# Patient Record
Sex: MALE | Race: WHITE | NOT HISPANIC OR LATINO
[De-identification: names, ages, dates, MRNs, and addresses within clinical notes are randomized per-mention and may not be internally consistent; named-entity substitution may affect disease eponyms.]

---

## 2021-08-25 DIAGNOSIS — Z20.822 CONTACT WITH AND (SUSPECTED) EXPOSURE TO COVID-19: ICD-10-CM

## 2021-08-27 ENCOUNTER — APPOINTMENT (OUTPATIENT)
Dept: DISASTER EMERGENCY | Facility: CLINIC | Age: 41
End: 2021-08-27

## 2021-08-29 LAB — SARS-COV-2 N GENE NPH QL NAA+PROBE: NOT DETECTED

## 2021-08-31 ENCOUNTER — APPOINTMENT (OUTPATIENT)
Dept: PULMONOLOGY | Facility: CLINIC | Age: 41
End: 2021-08-31
Payer: COMMERCIAL

## 2021-08-31 LAB — POCT - HEMOGLOBIN (HGB), QUANTITATIVE, TRANSCUTANEOUS: 14.7

## 2021-08-31 PROCEDURE — 94010 BREATHING CAPACITY TEST: CPT

## 2021-08-31 PROCEDURE — ZZZZZ: CPT

## 2021-08-31 PROCEDURE — 94727 GAS DIL/WSHOT DETER LNG VOL: CPT

## 2021-08-31 PROCEDURE — 99204 OFFICE O/P NEW MOD 45 MIN: CPT | Mod: 25

## 2021-08-31 PROCEDURE — 88738 HGB QUANT TRANSCUTANEOUS: CPT

## 2021-08-31 PROCEDURE — 94729 DIFFUSING CAPACITY: CPT

## 2021-09-02 NOTE — PROCEDURE
[FreeTextEntry1] : original study 2/24 2017 mild MAANDA with significant snoring\par \par Pulmonary function testing of August 31, 2021.\par FEV1 is reduced, FVC and FEV1/FVC are normal. TLC is normal. FRC is normal. RV is increased.  RV/TLC ratio is increased. Single breath diffusion capacity is normal. \par PFT attached relatively stable function.\par

## 2021-09-02 NOTE — DISCUSSION/SUMMARY
[FreeTextEntry1] : Obstructive sleep apnea syndrome.\par \par Borderline mild restrictive pulmonary physiology likely related to body habitus.

## 2021-09-02 NOTE — HISTORY OF PRESENT ILLNESS
[Former] : former [< 30 pack-years] : < 30 pack-years [Obstructive Sleep Apnea] : obstructive sleep apnea [Awakes Unrefreshed] : awakes unrefreshed [Awakes with Headache] : awakes with headache [Difficulty Initiating Sleep] : difficulty initiating sleep [Snoring] : snoring [TextBox_4] : CARLOS ZAVALETA is a 40 year old  M referred for pulmonary evaluation for AMANDA\par \par Has been using auto cpap dream machine since 3/2017. \par has 2 night HHS 2017 mild AMANDA\par Patient using machine nightly, if noes not feels am headaches and excessive day time sleepiness\par weight goes up and down, AMANDA worse with weight gain\par aware of recall and has registered\par \par No old machine\par \par AMANDA was mild on CPAP\par Had uncomplicated cholecystectomy 2 years prior\par \par Past pulmonary history. AMANDA\par Occupational Exposure.\par Family history of pulmonary disease.\par Recent travel N\par Pets Dogs not allergic\par  [TextBox_11] : 1 [TextBox_13] : 10 [YearQuit] : 2008

## 2021-09-02 NOTE — ASSESSMENT
[FreeTextEntry1] : will repeat 2 night HHs in January for replacement machine\par patient will bring in chip for download\par \par Patient was informed about the recall of [employer] CPAP machines.  Risks and benefits of continuing treatment versus interruption of treatment discussed.  Patient encouraged to go to the  website and to reach out to DME regarding device upgrade and or replacement.\par \par Options discussed including continued use of BC versus observation of CPAP.  Patient feels much more comfortable continuing CPAP.\par Does not have old machine.\par Also advised to call vendor and insurance company.\par \par

## 2021-09-02 NOTE — CONSULT LETTER
[Dear  ___] : Dear ~JANEL, [Consult Letter:] : I had the pleasure of evaluating your patient, [unfilled]. [Consult Closing:] : Thank you very much for allowing me to participate in the care of this patient.  If you have any questions, please do not hesitate to contact me. [Sincerely,] : Sincerely, [FreeTextEntry2] : Luis Gomez MD\par  [FreeTextEntry3] : Luis South MD FCCP\par

## 2022-01-25 ENCOUNTER — APPOINTMENT (OUTPATIENT)
Dept: PULMONOLOGY | Facility: CLINIC | Age: 42
End: 2022-01-25

## 2022-02-14 ENCOUNTER — APPOINTMENT (OUTPATIENT)
Dept: PULMONOLOGY | Facility: CLINIC | Age: 42
End: 2022-02-14
Payer: COMMERCIAL

## 2022-02-14 PROCEDURE — 95800 SLP STDY UNATTENDED: CPT

## 2022-02-15 ENCOUNTER — APPOINTMENT (OUTPATIENT)
Dept: UROLOGY | Facility: CLINIC | Age: 42
End: 2022-02-15
Payer: COMMERCIAL

## 2022-02-15 ENCOUNTER — TRANSCRIPTION ENCOUNTER (OUTPATIENT)
Age: 42
End: 2022-02-15

## 2022-02-15 DIAGNOSIS — Z87.438 PERSONAL HISTORY OF OTHER DISEASES OF MALE GENITAL ORGANS: ICD-10-CM

## 2022-02-15 DIAGNOSIS — N52.9 MALE ERECTILE DYSFUNCTION, UNSPECIFIED: ICD-10-CM

## 2022-02-15 PROCEDURE — 99204 OFFICE O/P NEW MOD 45 MIN: CPT | Mod: 95

## 2022-02-15 RX ORDER — SILDENAFIL 100 MG/1
100 TABLET, FILM COATED ORAL
Qty: 30 | Refills: 0 | Status: ACTIVE | COMMUNITY
Start: 2022-02-15 | End: 1900-01-01

## 2022-02-15 NOTE — PHYSICAL EXAM
[General Appearance - Well Developed] : well developed [General Appearance - Well Nourished] : well nourished [Normal Appearance] : normal appearance [Well Groomed] : well groomed [General Appearance - In No Acute Distress] : no acute distress [Oriented To Time, Place, And Person] : oriented to person, place, and time [Affect] : the affect was normal [Not Anxious] : not anxious [Mood] : the mood was normal

## 2022-02-15 NOTE — LETTER BODY
[FreeTextEntry1] : Dear Dr. Matias Leyva,\par \par Thank you for referring your patient Nico Yung for consultation for.  I have requested several baseline blood studies. I will see the patient back in followup shortly and make further recommendations. I have attached a copy of my consultation note for your records.\par \par Thank you again for this kind referral. I will certainly keep you updated with further progress. Please do not hesitate to call me if you have any questions.\par \par Best regards,\par \par \par \par Cristhian Oh M.D., PhD\par Professor of Urology\par    [facility] [employer] [facility]\par  for Quality\par Director, Reproductive and Sexual Medicine\par    [facility]

## 2022-02-15 NOTE — HISTORY OF PRESENT ILLNESS
[Home] : at home, [unfilled] , at the time of the visit. [Medical Office: ([facility])___] : at the medical office located in  [Verbal consent obtained from patient] : the patient, [unfilled] [FreeTextEntry1] : The patient-doctor. relationship has been established in a face-to-face fashion on real-time video audio HIPAA compliant communication using telemedicine software. The patient was at home and the physician was in his office. The patient's identity has been confirmed.  The patient was previously emailed a copy of the telemedicine consent.  The patient has had a chance to review and has now given verbal consent and has requested care to be assessed and treated through telemedicine. The patient understands there may be limitations in this process and that they need not need further follow-up care in the office and/or hospital settings. We were unable to use the [employer] platform and an alternative platform was utilized.  The patient was at home and I was in the office.\par \par Verbal consent was given on Tuesday, February 15, 2022 at 11 AM by the patient.  It was requested by the physician.  A written consent was previously sent for the patient to sign and return.\par \par Patient is a 41-year-old gentleman with epilepsy who presents with the chief complaint of erectile dysfunction for evaluation. His erections are 6/10 in tumescence and rigidity and tumescence. He takes 20 mg of Tadalafil a few years prior. He now takes 100 mg of sildenafil.  It appears to work very nicely.  He would like to have further evaluation of his erectile dysfunction.  He feels its due to the Tegretol that he has been on for his epilepsy.  His erections began to decrease approximately 6 months after starting the medication.  He is  and has 2 children..  He works as a .   I reviewed the questionnaire he had completed with him in detail.  The patient denies fevers, chills, nausea and/or vomiting and no unexplained weight loss. He has no known drug allergies.  His past medical history demonstrates no significant urologic issues.  In his present occupation as a  he has no known toxin exposure.  He does smoke and drinks only socially.  He has no known drug allergies.  His review of systems is non-contributory. His family history is not significant.

## 2022-02-15 NOTE — ASSESSMENT
[FreeTextEntry1] : This pleasant gentleman presents for evaluation of his sexual dysfunction.  I have requested several blood studies and a urine analysis.  I will make further recommendations when the results return. I have suggested a diagnostic injection and duplex ultrasound to evaluate his penile vasculature. We discussed his evaluation today and possible options for therapy depending upon the results of his testing.  I will be better able to make more specific recommendations after additional test results return.  He also has been using 100 mg of sildenafil and a prescription was written for him.\par 1. Review blood tests on follow up\par 2. Penile duplex study on follow up\par \par Telehealth Consultation: 40 minutes  20 minutes reviewing his history and discussing prior results.  20 minutes discussing various treatment options, writing medication prescriptions, requests for lab testing and writing his note. There was also additional time in preparing for the visit and assisting the patient with technology issues he was having with the telehealth platform.\par

## 2022-02-16 PROCEDURE — 95800 SLP STDY UNATTENDED: CPT

## 2022-03-14 ENCOUNTER — APPOINTMENT (OUTPATIENT)
Dept: PULMONOLOGY | Facility: CLINIC | Age: 42
End: 2022-03-14

## 2022-10-19 ENCOUNTER — APPOINTMENT (OUTPATIENT)
Dept: RADIOLOGY | Facility: CLINIC | Age: 42
End: 2022-10-19

## 2022-10-19 ENCOUNTER — OUTPATIENT (OUTPATIENT)
Dept: OUTPATIENT SERVICES | Facility: HOSPITAL | Age: 42
LOS: 1 days | End: 2022-10-19
Payer: COMMERCIAL

## 2022-10-19 DIAGNOSIS — J20.9 ACUTE BRONCHITIS, UNSPECIFIED: ICD-10-CM

## 2022-10-19 PROCEDURE — 71046 X-RAY EXAM CHEST 2 VIEWS: CPT | Mod: 26

## 2022-10-19 PROCEDURE — 71046 X-RAY EXAM CHEST 2 VIEWS: CPT

## 2022-12-05 ENCOUNTER — APPOINTMENT (OUTPATIENT)
Dept: CARDIOLOGY | Facility: CLINIC | Age: 42
End: 2022-12-05

## 2023-01-03 ENCOUNTER — EMERGENCY (EMERGENCY)
Facility: HOSPITAL | Age: 43
LOS: 1 days | Discharge: HOME | End: 2023-01-03
Attending: STUDENT IN AN ORGANIZED HEALTH CARE EDUCATION/TRAINING PROGRAM | Admitting: STUDENT IN AN ORGANIZED HEALTH CARE EDUCATION/TRAINING PROGRAM
Payer: COMMERCIAL

## 2023-01-03 VITALS
SYSTOLIC BLOOD PRESSURE: 140 MMHG | TEMPERATURE: 98 F | RESPIRATION RATE: 18 BRPM | HEART RATE: 66 BPM | OXYGEN SATURATION: 99 % | DIASTOLIC BLOOD PRESSURE: 81 MMHG

## 2023-01-03 VITALS
RESPIRATION RATE: 18 BRPM | SYSTOLIC BLOOD PRESSURE: 136 MMHG | TEMPERATURE: 98 F | HEART RATE: 65 BPM | DIASTOLIC BLOOD PRESSURE: 75 MMHG | OXYGEN SATURATION: 98 %

## 2023-01-03 LAB
ALBUMIN SERPL ELPH-MCNC: 4.8 G/DL — SIGNIFICANT CHANGE UP (ref 3.3–5)
ALP SERPL-CCNC: 76 U/L — SIGNIFICANT CHANGE UP (ref 40–120)
ALT FLD-CCNC: 28 U/L — SIGNIFICANT CHANGE UP (ref 4–41)
ANION GAP SERPL CALC-SCNC: 12 MMOL/L — SIGNIFICANT CHANGE UP (ref 7–14)
AST SERPL-CCNC: 21 U/L — SIGNIFICANT CHANGE UP (ref 4–40)
BASOPHILS # BLD AUTO: 0.03 K/UL — SIGNIFICANT CHANGE UP (ref 0–0.2)
BASOPHILS NFR BLD AUTO: 0.3 % — SIGNIFICANT CHANGE UP (ref 0–2)
BILIRUB SERPL-MCNC: 0.2 MG/DL — SIGNIFICANT CHANGE UP (ref 0.2–1.2)
BUN SERPL-MCNC: 16 MG/DL — SIGNIFICANT CHANGE UP (ref 7–23)
CALCIUM SERPL-MCNC: 9.8 MG/DL — SIGNIFICANT CHANGE UP (ref 8.4–10.5)
CHLORIDE SERPL-SCNC: 102 MMOL/L — SIGNIFICANT CHANGE UP (ref 98–107)
CK MB BLD-MCNC: 2.3 % — SIGNIFICANT CHANGE UP (ref 0–2.5)
CK MB CFR SERPL CALC: 1.7 NG/ML — SIGNIFICANT CHANGE UP
CK SERPL-CCNC: 73 U/L — SIGNIFICANT CHANGE UP (ref 30–200)
CO2 SERPL-SCNC: 28 MMOL/L — SIGNIFICANT CHANGE UP (ref 22–31)
CREAT SERPL-MCNC: 0.72 MG/DL — SIGNIFICANT CHANGE UP (ref 0.5–1.3)
D DIMER BLD IA.RAPID-MCNC: 920 NG/ML DDU — HIGH
EGFR: 118 ML/MIN/1.73M2 — SIGNIFICANT CHANGE UP
EOSINOPHIL # BLD AUTO: 0.53 K/UL — HIGH (ref 0–0.5)
EOSINOPHIL NFR BLD AUTO: 5 % — SIGNIFICANT CHANGE UP (ref 0–6)
FLUAV AG NPH QL: SIGNIFICANT CHANGE UP
FLUBV AG NPH QL: SIGNIFICANT CHANGE UP
GLUCOSE SERPL-MCNC: 103 MG/DL — HIGH (ref 70–99)
HCT VFR BLD CALC: 44.4 % — SIGNIFICANT CHANGE UP (ref 39–50)
HGB BLD-MCNC: 14.4 G/DL — SIGNIFICANT CHANGE UP (ref 13–17)
IANC: 7.39 K/UL — SIGNIFICANT CHANGE UP (ref 1.8–7.4)
IMM GRANULOCYTES NFR BLD AUTO: 0.8 % — SIGNIFICANT CHANGE UP (ref 0–0.9)
LIDOCAIN IGE QN: 24 U/L — SIGNIFICANT CHANGE UP (ref 7–60)
LYMPHOCYTES # BLD AUTO: 1.9 K/UL — SIGNIFICANT CHANGE UP (ref 1–3.3)
LYMPHOCYTES # BLD AUTO: 17.8 % — SIGNIFICANT CHANGE UP (ref 13–44)
MCHC RBC-ENTMCNC: 27.4 PG — SIGNIFICANT CHANGE UP (ref 27–34)
MCHC RBC-ENTMCNC: 32.4 GM/DL — SIGNIFICANT CHANGE UP (ref 32–36)
MCV RBC AUTO: 84.6 FL — SIGNIFICANT CHANGE UP (ref 80–100)
MONOCYTES # BLD AUTO: 0.73 K/UL — SIGNIFICANT CHANGE UP (ref 0–0.9)
MONOCYTES NFR BLD AUTO: 6.8 % — SIGNIFICANT CHANGE UP (ref 2–14)
NEUTROPHILS # BLD AUTO: 7.39 K/UL — SIGNIFICANT CHANGE UP (ref 1.8–7.4)
NEUTROPHILS NFR BLD AUTO: 69.3 % — SIGNIFICANT CHANGE UP (ref 43–77)
NRBC # BLD: 0 /100 WBCS — SIGNIFICANT CHANGE UP (ref 0–0)
NRBC # FLD: 0 K/UL — SIGNIFICANT CHANGE UP (ref 0–0)
PLATELET # BLD AUTO: 403 K/UL — HIGH (ref 150–400)
POTASSIUM SERPL-MCNC: 5.3 MMOL/L — SIGNIFICANT CHANGE UP (ref 3.5–5.3)
POTASSIUM SERPL-SCNC: 5.3 MMOL/L — SIGNIFICANT CHANGE UP (ref 3.5–5.3)
PROT SERPL-MCNC: 7.6 G/DL — SIGNIFICANT CHANGE UP (ref 6–8.3)
RBC # BLD: 5.25 M/UL — SIGNIFICANT CHANGE UP (ref 4.2–5.8)
RBC # FLD: 12.2 % — SIGNIFICANT CHANGE UP (ref 10.3–14.5)
RSV RNA NPH QL NAA+NON-PROBE: SIGNIFICANT CHANGE UP
SARS-COV-2 RNA SPEC QL NAA+PROBE: SIGNIFICANT CHANGE UP
SODIUM SERPL-SCNC: 142 MMOL/L — SIGNIFICANT CHANGE UP (ref 135–145)
TROPONIN T, HIGH SENSITIVITY RESULT: <6 NG/L — SIGNIFICANT CHANGE UP
WBC # BLD: 10.66 K/UL — HIGH (ref 3.8–10.5)
WBC # FLD AUTO: 10.66 K/UL — HIGH (ref 3.8–10.5)

## 2023-01-03 PROCEDURE — 74174 CTA ABD&PLVS W/CONTRAST: CPT | Mod: 26,MA

## 2023-01-03 PROCEDURE — 71275 CT ANGIOGRAPHY CHEST: CPT | Mod: 26,MA

## 2023-01-03 PROCEDURE — 71046 X-RAY EXAM CHEST 2 VIEWS: CPT | Mod: 26

## 2023-01-03 PROCEDURE — 99285 EMERGENCY DEPT VISIT HI MDM: CPT

## 2023-01-03 RX ADMIN — Medication 30 MILLILITER(S): at 13:17

## 2023-01-03 NOTE — ED PROVIDER NOTE - OBJECTIVE STATEMENT
42-year-old male with past medical history of hiatal hernia, GERD, epilepsy presents to ED complaining of chest pain since 1:30 AM.  Patient's the pain woke him up from his sleep, describes pain on the left side that progressed to sharp pain of his esophagus radiating to the left side of his neck.  Patient states does not feel like a burning like his usual GERD and took Pepcid which did not help.  Patient states he felt intermittent chills, diaphoresis, body aches and nausea which self resolved.  Patient denies any sick contacts, fevers, cough, shortness of breath, vomiting, diarrhea.

## 2023-01-03 NOTE — ED ADULT NURSE NOTE - CHIEF COMPLAINT QUOTE
Letter re-faxed to fax number below.  
Patient is calling to ask if his work status was faxed over to Jay. Patient states that when he was seen on 3/5 he requested the office note and work status be faxed to [phone] but Jay has not yet received it. Please re-fax. Patient's claim# is [id number].   
p/t c/o of midsternal chest pain radiating to throat since last night, denies any nausea or vomiting, denies sob, denies recent travel

## 2023-01-03 NOTE — ED PROVIDER NOTE - PATIENT PORTAL LINK FT
You can access the FollowMyHealth Patient Portal offered by [facility] by registering at the following website: [email or web address]. By joining [email or web address]’s FollowMyHealth portal, you will also be able to view your health information using other applications (apps) compatible with our system.

## 2023-01-03 NOTE — ED PROVIDER NOTE - PROGRESS NOTE DETAILS
Dimer elevated, patient updated on results.  CT angio ordered, CT tech aware.  Images pending. Jd Bangura, ED Attending JEROME Falcon- d dimer elevated. ct tech aware will r/o aortic dissection on CTA chest, abdomen and pelvis. CT tech called x3, will send transportation for patient to have dissection study done. Jd Bangura, ED Attending JEROME Falcon- pt feeling better. cta with no acute findings. amenable for dc home.

## 2023-01-03 NOTE — ED PROVIDER NOTE - NS ED ATTENDING STATEMENT MOD
This was a shared visit with the JAYLIN. I reviewed and verified the documentation and independently performed the documented:

## 2023-01-03 NOTE — ED ADULT NURSE NOTE - OBJECTIVE STATEMENT
patient a&Ox4, ambulatory, c/o midsternal chest pain/epigastric pain radiating to throat since last night. patient states he has hx of hilital hernia and this pain feels the same but more severe. patient well appearing, no acute distress noted. 20G IV Placed in R AC, labs sent.

## 2023-01-03 NOTE — ED ADULT TRIAGE NOTE - CHIEF COMPLAINT QUOTE
p/t c/o of midsternal chest pain radiating to throat since last night, denies any nausea or vomiting, denies sob, denies recent travel

## 2023-01-03 NOTE — ED PROVIDER NOTE - NSFOLLOWUPINSTRUCTIONS_ED_ALL_ED_FT
Follow up with Cardiology this week    Follow up with Gastroenterology this week. Take Pepcid 20 mg 30 minutes before meals 2x/day.  Take over the counter Maalox as needed. Stop eating spicy and acidic foods. Eat smaller meals more frequently.      Worsening pain, new fever, chills, nausea, vomiting, new chest pain/shortness of breath return to ER

## 2023-01-03 NOTE — ED PROVIDER NOTE - CLINICAL SUMMARY MEDICAL DECISION MAKING FREE TEXT BOX
42-year-old male with past medical history of hiatal hernia, GERD, epilepsy presents to ED complaining of chest pain since 1:30 AM.  Patient's the pain woke him up from his sleep, describes pain on the left side that progressed to sharp pain of his esophagus radiating to the left side of his neck.  Patient states does not feel like a burning like his usual GERD and took Pepcid which did not help.  Patient states he felt intermittent chills, diaphoresis, body aches and nausea which self resolved.  +pleuritic pain. plan to check labs, troponin, d-dimer r/o PE. EKG NSR. GI cocktail maalox reassess.

## 2023-01-03 NOTE — ED PROVIDER NOTE - CARDIAC, MLM
Normal rate, regular rhythm.  Heart sounds S1, S2.  No murmurs, rubs or gallops. No LE edema. Pulses 2+ b/l.

## 2023-01-03 NOTE — ED PROVIDER NOTE - ATTENDING APP SHARED VISIT CONTRIBUTION OF CARE
I have personally performed a face to face medical and diagnostic evaluation of the patient. I have discussed with and reviewed the ACP's note and agree with the History, ROS, Physical Exam and MDM unless otherwise indicated. A brief summary of my personal evaluation and impression can be found below.     42-year-old male with past medical history of hiatal hernia, epilepsy on medication presenting with chief complaint of epigastric pain and chest pain that started last night.  Patient states that he has had pain like this from his hiatal hernia/acid reflux, but the pain has never been this bad or lasted this long.  Radiates to the jaw, does not radiate to the back.  Associated nausea, but no vomiting.  No recent fevers, chills, diarrhea.  Pain is worse when laying flat, improves with any sort of walking or physical activity.  Tried to take Pepcid at home did not improve.  On exam, vital signs stable, not tachycardic or hypertensive.  Blood pressure in both arms equal.  Neurovascularly intact in all extremities.  Symptoms likely secondary to known acid reflux/gastritis/esophagitis.  Given the description of radiation to the jaw and chest, and associated diaphoresis, will dimer patient to assess risk for dissection.  No EKG changes in triage EKG.  Will treat for acid reflux.  If dimer elevated will order CT angio, dissection protocol.  Reassess to dispo. Jd Bangura, ED Attending

## 2023-01-03 NOTE — ED PROVIDER NOTE - NSICDXPASTMEDICALHX_GEN_ALL_CORE_FT
PAST MEDICAL HISTORY:  Epilepsy     GERD (gastroesophageal reflux disease)     Hiatal hernia     PNA (pneumonia)     Seizure

## 2023-01-12 ENCOUNTER — NON-APPOINTMENT (OUTPATIENT)
Age: 43
End: 2023-01-12

## 2023-01-18 PROBLEM — K21.9 GASTRO-ESOPHAGEAL REFLUX DISEASE WITHOUT ESOPHAGITIS: Chronic | Status: ACTIVE | Noted: 2023-01-03

## 2023-01-18 PROBLEM — K44.9 DIAPHRAGMATIC HERNIA WITHOUT OBSTRUCTION OR GANGRENE: Chronic | Status: ACTIVE | Noted: 2023-01-03

## 2023-01-19 ENCOUNTER — TRANSCRIPTION ENCOUNTER (OUTPATIENT)
Age: 43
End: 2023-01-19

## 2023-01-19 ENCOUNTER — APPOINTMENT (OUTPATIENT)
Dept: CARDIOLOGY | Facility: CLINIC | Age: 43
End: 2023-01-19
Payer: COMMERCIAL

## 2023-01-19 ENCOUNTER — NON-APPOINTMENT (OUTPATIENT)
Age: 43
End: 2023-01-19

## 2023-01-19 VITALS
WEIGHT: 281 LBS | DIASTOLIC BLOOD PRESSURE: 70 MMHG | OXYGEN SATURATION: 97 % | HEIGHT: 71 IN | SYSTOLIC BLOOD PRESSURE: 110 MMHG | BODY MASS INDEX: 39.34 KG/M2 | HEART RATE: 63 BPM | TEMPERATURE: 98.5 F

## 2023-01-19 DIAGNOSIS — Z99.89 OBSTRUCTIVE SLEEP APNEA (ADULT) (PEDIATRIC): ICD-10-CM

## 2023-01-19 DIAGNOSIS — Z82.49 FAMILY HISTORY OF ISCHEMIC HEART DISEASE AND OTHER DISEASES OF THE CIRCULATORY SYSTEM: ICD-10-CM

## 2023-01-19 DIAGNOSIS — Z12.5 ENCOUNTER FOR SCREENING FOR MALIGNANT NEOPLASM OF PROSTATE: ICD-10-CM

## 2023-01-19 DIAGNOSIS — F32.A DEPRESSION, UNSPECIFIED: ICD-10-CM

## 2023-01-19 DIAGNOSIS — G47.33 OBSTRUCTIVE SLEEP APNEA (ADULT) (PEDIATRIC): ICD-10-CM

## 2023-01-19 DIAGNOSIS — R00.2 PALPITATIONS: ICD-10-CM

## 2023-01-19 PROCEDURE — 93306 TTE W/DOPPLER COMPLETE: CPT

## 2023-01-19 PROCEDURE — 93970 EXTREMITY STUDY: CPT

## 2023-01-19 PROCEDURE — 93000 ELECTROCARDIOGRAM COMPLETE: CPT

## 2023-01-19 PROCEDURE — 99204 OFFICE O/P NEW MOD 45 MIN: CPT

## 2023-01-19 RX ORDER — ESCITALOPRAM OXALATE 10 MG/1
10 TABLET, FILM COATED ORAL
Refills: 0 | Status: ACTIVE | COMMUNITY
Start: 2023-01-19

## 2023-01-19 NOTE — HISTORY OF PRESENT ILLNESS
[FreeTextEntry1] : This is a 42 year old male who presents today for cardiac evaluation. He has family hx of CHF, HTN, MI, CAD in both parents. He was seen in ER 1/3/2023 for midsternal chest pain. Pt was noted to have a an elevated d-dimer however negative CTA however not pulm embolus protocol . He denies shortness of breath or JOE. He does endorse past episodes of palpations while walking long distance as a . He has no other acute complaints for today.

## 2023-01-20 ENCOUNTER — RESULT REVIEW (OUTPATIENT)
Age: 43
End: 2023-01-20

## 2023-01-20 ENCOUNTER — APPOINTMENT (OUTPATIENT)
Dept: CARDIOLOGY | Facility: CLINIC | Age: 43
End: 2023-01-20

## 2023-01-20 ENCOUNTER — OUTPATIENT (OUTPATIENT)
Dept: OUTPATIENT SERVICES | Facility: HOSPITAL | Age: 43
LOS: 1 days | End: 2023-01-20
Payer: COMMERCIAL

## 2023-01-20 DIAGNOSIS — R79.89 OTHER SPECIFIED ABNORMAL FINDINGS OF BLOOD CHEMISTRY: ICD-10-CM

## 2023-01-20 DIAGNOSIS — R07.89 OTHER CHEST PAIN: ICD-10-CM

## 2023-01-20 PROCEDURE — 71275 CT ANGIOGRAPHY CHEST: CPT

## 2023-01-20 PROCEDURE — 75574 CT ANGIO HRT W/3D IMAGE: CPT | Mod: 26

## 2023-01-20 PROCEDURE — 93970 EXTREMITY STUDY: CPT

## 2023-01-20 PROCEDURE — 71275 CT ANGIOGRAPHY CHEST: CPT | Mod: 26

## 2023-01-20 PROCEDURE — 93970 EXTREMITY STUDY: CPT | Mod: 26

## 2023-01-20 PROCEDURE — 75574 CT ANGIO HRT W/3D IMAGE: CPT

## 2023-01-24 ENCOUNTER — NON-APPOINTMENT (OUTPATIENT)
Age: 43
End: 2023-01-24

## 2023-01-25 ENCOUNTER — APPOINTMENT (OUTPATIENT)
Dept: CARDIOLOGY | Facility: CLINIC | Age: 43
End: 2023-01-25
Payer: COMMERCIAL

## 2023-01-25 VITALS
TEMPERATURE: 97.6 F | OXYGEN SATURATION: 96 % | HEART RATE: 67 BPM | DIASTOLIC BLOOD PRESSURE: 65 MMHG | BODY MASS INDEX: 39.76 KG/M2 | SYSTOLIC BLOOD PRESSURE: 120 MMHG | WEIGHT: 284 LBS | HEIGHT: 71 IN

## 2023-01-25 PROCEDURE — 99214 OFFICE O/P EST MOD 30 MIN: CPT

## 2023-01-25 RX ORDER — RIVAROXABAN 15 MG/1
15 TABLET, FILM COATED ORAL
Qty: 42 | Refills: 0 | Status: DISCONTINUED | COMMUNITY
Start: 2023-01-19 | End: 2023-01-25

## 2023-01-25 NOTE — REASON FOR VISIT
[FreeTextEntry1] : 42 year old male\par History of grand mal seizures while sleeping (last one was 10 years ago )  he is on Tegretol since 2007.\par \par History of hiatal hernia.\par \par The case is as follows:\par Jan 3rd went to ER for chest pains, throught it was his hiatal hernia\par D-dimer was 900+\par CTA chest was negative for PE\par \par He was sent home from ER (no AC )\par \par Seen by Dr. Garcia Jan 19th.  Duplex showed possible right Prox FV DVT.  I reviewed the images, there was a focal area that resembled either chronic DVT vs artifact.  This was not seen on duplex that was performed at NS within the same time period.  He was started on xarelto 15mg BID and 4 days later a dimer was checked which was Normal.\par \par He has no personal history of VTE\par No family history of VTE\par \par He is active and walks 11 miles per day \par \par Coronary CTA did show coronary calcifications. His LDL is 162

## 2023-01-25 NOTE — PHYSICAL EXAM
[Abnormal Walk] : normal gait [Gait - Sufficient For Exercise Testing] : the gait was sufficient for exercise testing [Nail Clubbing] : no clubbing of the fingernails [Cyanosis, Localized] : no localized cyanosis [Petechial Hemorrhages (___cm)] : no petechial hemorrhages [Skin Color & Pigmentation] : normal skin color and pigmentation [] : no rash [No Venous Stasis] : no venous stasis [Skin Lesions] : no skin lesions [No Skin Ulcers] : no skin ulcer [No Xanthoma] : no  xanthoma was observed

## 2023-01-26 ENCOUNTER — APPOINTMENT (OUTPATIENT)
Dept: CARDIOLOGY | Facility: CLINIC | Age: 43
End: 2023-01-26

## 2023-01-27 ENCOUNTER — NON-APPOINTMENT (OUTPATIENT)
Age: 43
End: 2023-01-27

## 2023-02-27 ENCOUNTER — LABORATORY RESULT (OUTPATIENT)
Age: 43
End: 2023-02-27

## 2023-03-01 ENCOUNTER — NON-APPOINTMENT (OUTPATIENT)
Age: 43
End: 2023-03-01

## 2023-03-01 ENCOUNTER — APPOINTMENT (OUTPATIENT)
Dept: CARDIOLOGY | Facility: CLINIC | Age: 43
End: 2023-03-01
Payer: COMMERCIAL

## 2023-03-01 VITALS
DIASTOLIC BLOOD PRESSURE: 80 MMHG | HEIGHT: 71 IN | WEIGHT: 290 LBS | SYSTOLIC BLOOD PRESSURE: 132 MMHG | HEART RATE: 67 BPM | TEMPERATURE: 98.2 F | OXYGEN SATURATION: 98 % | BODY MASS INDEX: 40.6 KG/M2

## 2023-03-01 VITALS
HEIGHT: 71 IN | TEMPERATURE: 97.3 F | DIASTOLIC BLOOD PRESSURE: 77 MMHG | HEART RATE: 68 BPM | WEIGHT: 292 LBS | OXYGEN SATURATION: 99 % | SYSTOLIC BLOOD PRESSURE: 119 MMHG | BODY MASS INDEX: 40.88 KG/M2

## 2023-03-01 DIAGNOSIS — R07.89 OTHER CHEST PAIN: ICD-10-CM

## 2023-03-01 LAB — DEPRECATED D DIMER PPP IA-ACNC: <150 NG/ML DDU

## 2023-03-01 PROCEDURE — 99214 OFFICE O/P EST MOD 30 MIN: CPT

## 2023-03-01 PROCEDURE — 93000 ELECTROCARDIOGRAM COMPLETE: CPT

## 2023-03-01 RX ORDER — ASPIRIN 81 MG/1
81 TABLET, FILM COATED ORAL DAILY
Refills: 0 | Status: ACTIVE | COMMUNITY
Start: 2023-03-01

## 2023-03-01 RX ORDER — RIVAROXABAN 20 MG/1
20 TABLET, FILM COATED ORAL
Qty: 1 | Refills: 6 | Status: DISCONTINUED | COMMUNITY
Start: 2023-01-25 | End: 2023-03-01

## 2023-03-01 NOTE — HISTORY OF PRESENT ILLNESS
[FreeTextEntry1] : This is a 43 y/o male with a pmhx of CAD, AMANDA, epilepsy DVT  here today for a follow up. He was last seen in the office on 1/19/23 and noted with elevated d-dimer and had dopplers which showed right non-occlusive proximal femoral DVT. patient to start on xarelto 15 mg. He followed up with vascular, Dr. Calvert who reported that area looked like chronic DVT vs artifact. He d-dimer was repeated and was normal. Patient stopped xarelto due to interaction with tegretol. As per vascular, will trend d- dimers and Pt to be on ASA 81 mg. \par Patient denies chest pain, dyspnea, palpitations, dizziness, syncope, changes in bowel/bladder habits or appetite.

## 2023-03-01 NOTE — ASSESSMENT
[FreeTextEntry1] : Assessment:\par 1.  Elevated D-dimer Jan 3rd\par 2.  Possible chronic DVT R FV vs. artifact\par 3.  no PE\par 4.  CAD on CT scan\par 5.  Elevated lipid panel\par 6.  history of grand mal seizures\par \par Plan\par 1. The case is interesting, I am not sure if the d-dimer elevation was due to a true VTE event, and furthermore , even while on A/C for only 4 days, his d-dimer normalized.  The duplex  dependant, but upon review of the images it is hard for me to tell whether this is just a chronic VTE vs. artifact\par 2. He has stopped Xarelto for fear of a/c with his grand mal seizures\par 3.  in this case will trend out dimers now, in 2 weeks and 1 month time\par 4.  Venous duplex now and again in 1 month and 3 months\par 5.  Start at least aspirin 81mg daily \par 6.  statin \par 7. Appreciate Dr. Garcia's care

## 2023-03-01 NOTE — REASON FOR VISIT
[FreeTextEntry1] : 3/1/2023\par \par He self discontinued xarleto 2.5 weeks ago\par prior to that he took from Jan 19th - 25 - 15mg BID\par then reduced to 20mg daily for about 10 days\par then off\par He is feeling fine.\par he was started on lipitor\par Breathing is fine\par legs feel ok \par \par \par 42 year old male\par History of grand mal seizures while sleeping (last one was 10 years ago )  he is on Tegretol since 2007.\par \par History of hiatal hernia.\par \par The case is as follows:\par Jan 3rd went to ER for chest pains, throught it was his hiatal hernia\par D-dimer was 900+\par CTA chest was negative for PE\par \par He was sent home from ER (no AC )\par \par Seen by Dr. Garcia Jan 19th.  Duplex showed possible right Prox FV DVT.  I reviewed the images, there was a focal area that resembled either chronic DVT vs artifact.  This was not seen on duplex that was performed at [facility] within the same time period.  He was started on xarelto 15mg BID and 4 days later a dimer was checked which was Normal.\par \par He has no personal history of VTE\par No family history of VTE\par \par He is active and walks 11 miles per day \par \par Coronary CTA did show coronary calcifications. His LDL is 162

## 2023-03-15 NOTE — PHYSICAL EXAM
no [No Acute Distress] : no acute distress [Supple] : supple [No JVD] : no jvd [Normal S1, S2] : normal s1, s2 [No Murmurs] : no murmurs [Clear to Auscultation Bilaterally] : clear to auscultation bilaterally [Normal to Percussion] : normal to percussion [Benign] : benign [No HSM] : no hsm [No Clubbing] : no clubbing [No Cyanosis] : no cyanosis [No Edema] : no edema

## 2023-03-17 LAB — DEPRECATED D DIMER PPP IA-ACNC: <150 NG/ML DDU

## 2023-03-30 RX ORDER — ATORVASTATIN CALCIUM 20 MG/1
20 TABLET, FILM COATED ORAL
Qty: 90 | Refills: 0 | Status: DISCONTINUED | COMMUNITY
Start: 2023-01-26 | End: 2023-03-30

## 2023-04-11 ENCOUNTER — APPOINTMENT (OUTPATIENT)
Dept: ULTRASOUND IMAGING | Facility: CLINIC | Age: 43
End: 2023-04-11
Payer: COMMERCIAL

## 2023-04-11 ENCOUNTER — OUTPATIENT (OUTPATIENT)
Dept: OUTPATIENT SERVICES | Facility: HOSPITAL | Age: 43
LOS: 1 days | End: 2023-04-11
Payer: COMMERCIAL

## 2023-04-11 DIAGNOSIS — Z00.8 ENCOUNTER FOR OTHER GENERAL EXAMINATION: ICD-10-CM

## 2023-04-11 DIAGNOSIS — R79.89 OTHER SPECIFIED ABNORMAL FINDINGS OF BLOOD CHEMISTRY: ICD-10-CM

## 2023-04-11 PROCEDURE — 93970 EXTREMITY STUDY: CPT

## 2023-04-11 PROCEDURE — 93970 EXTREMITY STUDY: CPT | Mod: 26

## 2023-04-26 ENCOUNTER — APPOINTMENT (OUTPATIENT)
Dept: CARDIOLOGY | Facility: CLINIC | Age: 43
End: 2023-04-26

## 2023-05-02 ENCOUNTER — NON-APPOINTMENT (OUTPATIENT)
Age: 43
End: 2023-05-02

## 2023-05-02 ENCOUNTER — APPOINTMENT (OUTPATIENT)
Dept: CARDIOLOGY | Facility: CLINIC | Age: 43
End: 2023-05-02
Payer: COMMERCIAL

## 2023-05-02 VITALS
TEMPERATURE: 97.9 F | WEIGHT: 290 LBS | SYSTOLIC BLOOD PRESSURE: 110 MMHG | BODY MASS INDEX: 40.6 KG/M2 | OXYGEN SATURATION: 97 % | HEART RATE: 70 BPM | DIASTOLIC BLOOD PRESSURE: 70 MMHG | HEIGHT: 71 IN

## 2023-05-02 DIAGNOSIS — E66.9 OVERWEIGHT: ICD-10-CM

## 2023-05-02 DIAGNOSIS — E78.5 HYPERLIPIDEMIA, UNSPECIFIED: ICD-10-CM

## 2023-05-02 DIAGNOSIS — R79.89 OTHER SPECIFIED ABNORMAL FINDINGS OF BLOOD CHEMISTRY: ICD-10-CM

## 2023-05-02 DIAGNOSIS — I25.10 ATHEROSCLEROTIC HEART DISEASE OF NATIVE CORONARY ARTERY W/OUT ANGINA PECTORIS: ICD-10-CM

## 2023-05-02 DIAGNOSIS — R09.81 NASAL CONGESTION: ICD-10-CM

## 2023-05-02 DIAGNOSIS — E66.3 OVERWEIGHT: ICD-10-CM

## 2023-05-02 DIAGNOSIS — Z13.228 ENCOUNTER FOR SCREENING FOR OTHER METABOLIC DISORDERS: ICD-10-CM

## 2023-05-02 DIAGNOSIS — G40.909 EPILEPSY, UNSPECIFIED, NOT INTRACTABLE, W/OUT STATUS EPILEPTICUS: ICD-10-CM

## 2023-05-02 PROCEDURE — 93000 ELECTROCARDIOGRAM COMPLETE: CPT

## 2023-05-02 PROCEDURE — 99214 OFFICE O/P EST MOD 30 MIN: CPT

## 2023-05-02 RX ORDER — ROSUVASTATIN CALCIUM 10 MG/1
10 TABLET, FILM COATED ORAL
Qty: 90 | Refills: 3 | Status: ACTIVE | COMMUNITY
Start: 2023-03-30 | End: 1900-01-01

## 2023-05-02 RX ORDER — FLUTICASONE PROPIONATE 50 UG/1
50 SPRAY, METERED NASAL
Qty: 1 | Refills: 1 | Status: ACTIVE | COMMUNITY
Start: 2023-05-02 | End: 1900-01-01

## 2023-05-02 RX ORDER — FEXOFENADINE HYDROCHLORIDE 180 MG/1
180 TABLET ORAL DAILY
Qty: 30 | Refills: 3 | Status: ACTIVE | COMMUNITY
Start: 2023-05-02 | End: 1900-01-01

## 2023-05-02 RX ORDER — AMOXICILLIN AND CLAVULANATE POTASSIUM 875; 125 MG/1; MG/1
875-125 TABLET, COATED ORAL
Qty: 20 | Refills: 0 | Status: ACTIVE | COMMUNITY
Start: 2023-05-02 | End: 1900-01-01

## 2023-05-02 NOTE — HISTORY OF PRESENT ILLNESS
[FreeTextEntry1] : This is a 41 y/o male with a pmhx of CAD, AMANDA, epilepsy DVT here today for a follow up. pt reports 2 weeksd of sinus congestion reports covid negative x 2 Alayna any fever or chills \par Patient denies chest pain, dyspnea, palpitations, dizziness, syncope, changes in bowel/bladder habits or appetite.

## 2023-05-09 ENCOUNTER — NON-APPOINTMENT (OUTPATIENT)
Age: 43
End: 2023-05-09

## 2023-05-09 LAB
ALBUMIN SERPL ELPH-MCNC: 4.7 G/DL
ALP BLD-CCNC: 83 U/L
ALT SERPL-CCNC: 31 U/L
ANION GAP SERPL CALC-SCNC: 13 MMOL/L
APPEARANCE: CLEAR
AST SERPL-CCNC: 21 U/L
BACTERIA: NEGATIVE /HPF
BASOPHILS # BLD AUTO: 0.06 K/UL
BASOPHILS NFR BLD AUTO: 0.8 %
BILIRUB SERPL-MCNC: 0.2 MG/DL
BILIRUBIN URINE: NEGATIVE
BLOOD URINE: NEGATIVE
BUN SERPL-MCNC: 14 MG/DL
CALCIUM SERPL-MCNC: 10.1 MG/DL
CAST: 0 /LPF
CHLORIDE SERPL-SCNC: 103 MMOL/L
CHOLEST SERPL-MCNC: 152 MG/DL
CK SERPL-CCNC: 77 U/L
CO2 SERPL-SCNC: 26 MMOL/L
COLOR: NORMAL
CREAT SERPL-MCNC: 0.78 MG/DL
EGFR: 116 ML/MIN/1.73M2
EOSINOPHIL # BLD AUTO: 0.36 K/UL
EOSINOPHIL NFR BLD AUTO: 4.8 %
EPITHELIAL CELLS: 1 /HPF
ESTIMATED AVERAGE GLUCOSE: 117 MG/DL
GLUCOSE QUALITATIVE U: NEGATIVE MG/DL
GLUCOSE SERPL-MCNC: 99 MG/DL
HBA1C MFR BLD HPLC: 5.7 %
HCT VFR BLD CALC: 45.9 %
HDLC SERPL-MCNC: 46 MG/DL
HGB BLD-MCNC: 14.7 G/DL
IMM GRANULOCYTES NFR BLD AUTO: 0.3 %
KETONES URINE: NEGATIVE MG/DL
LDLC SERPL CALC-MCNC: 90 MG/DL
LEUKOCYTE ESTERASE URINE: NEGATIVE
LYMPHOCYTES # BLD AUTO: 1.97 K/UL
LYMPHOCYTES NFR BLD AUTO: 26.4 %
MAN DIFF?: NORMAL
MCHC RBC-ENTMCNC: 27.8 PG
MCHC RBC-ENTMCNC: 32 GM/DL
MCV RBC AUTO: 86.9 FL
MICROSCOPIC-UA: NORMAL
MONOCYTES # BLD AUTO: 0.77 K/UL
MONOCYTES NFR BLD AUTO: 10.3 %
NEUTROPHILS # BLD AUTO: 4.29 K/UL
NEUTROPHILS NFR BLD AUTO: 57.4 %
NITRITE URINE: NEGATIVE
NONHDLC SERPL-MCNC: 106 MG/DL
PH URINE: 5.5
PLATELET # BLD AUTO: 333 K/UL
POTASSIUM SERPL-SCNC: 4.9 MMOL/L
PROT SERPL-MCNC: 7.3 G/DL
PROTEIN URINE: NORMAL MG/DL
RBC # BLD: 5.28 M/UL
RBC # FLD: 12.2 %
RED BLOOD CELLS URINE: 0 /HPF
SODIUM SERPL-SCNC: 143 MMOL/L
SPECIFIC GRAVITY URINE: 1.03
T4 FREE SERPL-MCNC: 1 NG/DL
TRIGL SERPL-MCNC: 79 MG/DL
TSH SERPL-ACNC: 1.7 UIU/ML
UROBILINOGEN URINE: 0.2 MG/DL
WBC # FLD AUTO: 7.47 K/UL
WHITE BLOOD CELLS URINE: 0 /HPF

## 2023-06-27 ENCOUNTER — APPOINTMENT (OUTPATIENT)
Dept: ENDOCRINOLOGY | Facility: CLINIC | Age: 43
End: 2023-06-27
Payer: COMMERCIAL

## 2023-06-27 VITALS
BODY MASS INDEX: 41.02 KG/M2 | SYSTOLIC BLOOD PRESSURE: 108 MMHG | TEMPERATURE: 97.5 F | HEART RATE: 72 BPM | WEIGHT: 293 LBS | OXYGEN SATURATION: 97 % | HEIGHT: 71 IN | DIASTOLIC BLOOD PRESSURE: 80 MMHG

## 2023-06-27 DIAGNOSIS — R73.03 PREDIABETES.: ICD-10-CM

## 2023-06-27 DIAGNOSIS — E66.9 OBESITY, UNSPECIFIED: ICD-10-CM

## 2023-06-27 PROCEDURE — 99204 OFFICE O/P NEW MOD 45 MIN: CPT

## 2023-06-27 RX ORDER — SEMAGLUTIDE 0.25 MG/.5ML
0.25 INJECTION, SOLUTION SUBCUTANEOUS
Qty: 1 | Refills: 1 | Status: ACTIVE | COMMUNITY
Start: 2023-06-27 | End: 1900-01-01

## 2023-06-27 NOTE — HISTORY OF PRESENT ILLNESS
[FreeTextEntry1] : 42 year M here for assessment of obesity \par \par Patient with hx of CAD, AMANDA, epilepsy, pre-DM\par \par Follows a special diet: None\par Food Cravings: No \par Tried to lose weight before: Yes  \par Tried any diet plans/ meal replacements for weight control: No\par Tried OTC or prescription medication for weight control: No\par Activity level: typically active, reports he walks 11miles/ day with job\par Ease of skin bruising: No\par Proximal muscle weakness: No\par Prior weight loss surgery: No  \par \par \par History of the following:  \par Thyroid disease: No\par Heart disease: No\par Mood disorder: No\par Hypertension: No\par Seizures: yes\par Gall bladder disease: had cholecystectomy\par Known diabetic retinopathy: No (No semaglutide)\par Pancreatitis: No\par Glaucoma: No  (No phen-top)\par Urolithiasis: No (No phentermine-top/ no orlistat)\par Organ transplant: No\par

## 2023-06-27 NOTE — PHYSICAL EXAM
[Alert] : alert [Well Nourished] : well nourished [Obese] : obese [No Acute Distress] : no acute distress [Well Developed] : well developed [Normal Sclera/Conjunctiva] : normal sclera/conjunctiva [EOMI] : extra ocular movement intact [No Proptosis] : no proptosis [Normal Oropharynx] : the oropharynx was normal [Thyroid Not Enlarged] : the thyroid was not enlarged [No Respiratory Distress] : no respiratory distress [No Accessory Muscle Use] : no accessory muscle use [Clear to Auscultation] : lungs were clear to auscultation bilaterally [Normal S1, S2] : normal S1 and S2 [Normal Rate] : heart rate was normal [Regular Rhythm] : with a regular rhythm [No Edema] : no peripheral edema [Pedal Pulses Normal] : the pedal pulses are present [Normal Bowel Sounds] : normal bowel sounds [Not Tender] : non-tender [Not Distended] : not distended [Soft] : abdomen soft [Normal Anterior Cervical Nodes] : no anterior cervical lymphadenopathy [Normal Posterior Cervical Nodes] : no posterior cervical lymphadenopathy [No Spinal Tenderness] : no spinal tenderness [Spine Straight] : spine straight [No Stigmata of Cushings Syndrome] : no stigmata of Cushings Syndrome [Normal Gait] : normal gait [Normal Strength/Tone] : muscle strength and tone were normal [No Rash] : no rash [Normal Reflexes] : deep tendon reflexes were 2+ and symmetric [No Tremors] : no tremors [Oriented x3] : oriented to person, place, and time [Acanthosis Nigricans] : no acanthosis nigricans

## 2023-08-10 ENCOUNTER — APPOINTMENT (OUTPATIENT)
Dept: ENDOCRINOLOGY | Facility: CLINIC | Age: 43
End: 2023-08-10

## 2023-11-07 ENCOUNTER — APPOINTMENT (OUTPATIENT)
Dept: CARDIOLOGY | Facility: CLINIC | Age: 43
End: 2023-11-07

## 2024-04-28 LAB
ALBUMIN SERPL ELPH-MCNC: 4.4 G/DL
ALP BLD-CCNC: 73 U/L
ALT SERPL-CCNC: 29 U/L
ANION GAP SERPL CALC-SCNC: 11 MMOL/L
APPEARANCE: CLEAR
AST SERPL-CCNC: 20 U/L
BACTERIA: NEGATIVE
BASOPHILS # BLD AUTO: 0.1 K/UL
BASOPHILS # BLD AUTO: 0.1 K/UL
BASOPHILS NFR BLD AUTO: 1.1 %
BASOPHILS NFR BLD AUTO: 1.2 %
BILIRUB DIRECT SERPL-MCNC: 0.1 MG/DL
BILIRUB INDIRECT SERPL-MCNC: 0.1 MG/DL
BILIRUB SERPL-MCNC: 0.2 MG/DL
BILIRUBIN URINE: NEGATIVE
BLOOD URINE: NEGATIVE
BUN SERPL-MCNC: 21 MG/DL
CALCIUM SERPL-MCNC: 9.7 MG/DL
CARBAMAZEPINE SERPL-MCNC: 9 UG/ML
CHLORIDE SERPL-SCNC: 104 MMOL/L
CHOLEST SERPL-MCNC: 149 MG/DL
CK SERPL-CCNC: 92 U/L
CO2 SERPL-SCNC: 26 MMOL/L
COLOR: YELLOW
CREAT SERPL-MCNC: 0.75 MG/DL
DEPRECATED D DIMER PPP IA-ACNC: <150 NG/ML DDU
EGFR: 117 ML/MIN/1.73M2
EOSINOPHIL # BLD AUTO: 0.39 K/UL
EOSINOPHIL # BLD AUTO: 0.4 K/UL
EOSINOPHIL NFR BLD AUTO: 4.6 %
EOSINOPHIL NFR BLD AUTO: 4.7 %
ESTIMATED AVERAGE GLUCOSE: 114 MG/DL
GLUCOSE QUALITATIVE U: NEGATIVE
GLUCOSE SERPL-MCNC: 83 MG/DL
HBA1C MFR BLD HPLC: 5.6 %
HCT VFR BLD CALC: 44.5 %
HCT VFR BLD CALC: 45.7 %
HDLC SERPL-MCNC: 50 MG/DL
HGB BLD-MCNC: 14.5 G/DL
HGB BLD-MCNC: 14.6 G/DL
HYALINE CASTS: 5 /LPF
IMM GRANULOCYTES NFR BLD AUTO: 0.3 %
IMM GRANULOCYTES NFR BLD AUTO: 0.5 %
KETONES URINE: NEGATIVE
LDLC SERPL CALC-MCNC: 69 MG/DL
LEUKOCYTE ESTERASE URINE: NEGATIVE
LYMPHOCYTES # BLD AUTO: 2.11 K/UL
LYMPHOCYTES # BLD AUTO: 2.38 K/UL
LYMPHOCYTES NFR BLD AUTO: 25.7 %
LYMPHOCYTES NFR BLD AUTO: 27.2 %
MAN DIFF?: NORMAL
MAN DIFF?: NORMAL
MCHC RBC-ENTMCNC: 28.4 PG
MCHC RBC-ENTMCNC: 28.5 PG
MCHC RBC-ENTMCNC: 31.9 GM/DL
MCHC RBC-ENTMCNC: 32.6 GM/DL
MCV RBC AUTO: 87.3 FL
MCV RBC AUTO: 89.1 FL
MICROSCOPIC-UA: NORMAL
MONOCYTES # BLD AUTO: 0.65 K/UL
MONOCYTES # BLD AUTO: 0.77 K/UL
MONOCYTES NFR BLD AUTO: 7.4 %
MONOCYTES NFR BLD AUTO: 9.4 %
NEUTROPHILS # BLD AUTO: 4.81 K/UL
NEUTROPHILS # BLD AUTO: 5.19 K/UL
NEUTROPHILS NFR BLD AUTO: 58.5 %
NEUTROPHILS NFR BLD AUTO: 59.4 %
NITRITE URINE: NEGATIVE
NONHDLC SERPL-MCNC: 99 MG/DL
PH URINE: 5.5
PLATELET # BLD AUTO: 391 K/UL
PLATELET # BLD AUTO: 402 K/UL
POTASSIUM SERPL-SCNC: 4.5 MMOL/L
PROT SERPL-MCNC: 6.4 G/DL
PROTEIN URINE: ABNORMAL
PSA SERPL-MCNC: 1.01 NG/ML
RBC # BLD: 5.1 M/UL
RBC # BLD: 5.13 M/UL
RBC # FLD: 12.2 %
RBC # FLD: 12.3 %
RED BLOOD CELLS URINE: 2 /HPF
SODIUM SERPL-SCNC: 141 MMOL/L
SPECIFIC GRAVITY URINE: 1.03
SQUAMOUS EPITHELIAL CELLS: 0 /HPF
T3FREE SERPL-MCNC: 3.19 PG/ML
T4 FREE SERPL-MCNC: 1.1 NG/DL
TRIGL SERPL-MCNC: 151 MG/DL
TSH SERPL-ACNC: 1.12 UIU/ML
UROBILINOGEN URINE: NORMAL
WBC # FLD AUTO: 8.22 K/UL
WBC # FLD AUTO: 8.75 K/UL
WHITE BLOOD CELLS URINE: 0 /HPF

## 2024-05-06 LAB
CHOLEST SERPL-MCNC: 230 MG/DL
HDLC SERPL-MCNC: 50 MG/DL
LDLC SERPL CALC-MCNC: 162 MG/DL
NONHDLC SERPL-MCNC: 181 MG/DL
TRIGL SERPL-MCNC: 93 MG/DL

## 2025-01-29 ENCOUNTER — APPOINTMENT (OUTPATIENT)
Dept: CARDIOLOGY | Facility: CLINIC | Age: 45
End: 2025-01-29